# Patient Record
Sex: FEMALE | Race: WHITE | Employment: FULL TIME | URBAN - METROPOLITAN AREA
[De-identification: names, ages, dates, MRNs, and addresses within clinical notes are randomized per-mention and may not be internally consistent; named-entity substitution may affect disease eponyms.]

---

## 2022-09-19 VITALS — HEIGHT: 67 IN

## 2022-09-19 PROBLEM — E66.9 OBESITY: Status: ACTIVE | Noted: 2022-09-19

## 2022-09-22 ENCOUNTER — OFFICE VISIT (OUTPATIENT)
Dept: OBGYN CLINIC | Age: 37
End: 2022-09-22
Payer: COMMERCIAL

## 2022-09-22 VITALS
HEIGHT: 67 IN | DIASTOLIC BLOOD PRESSURE: 64 MMHG | BODY MASS INDEX: 23.6 KG/M2 | SYSTOLIC BLOOD PRESSURE: 112 MMHG | WEIGHT: 150.38 LBS

## 2022-09-22 DIAGNOSIS — Z12.4 SCREENING FOR MALIGNANT NEOPLASM OF CERVIX: Primary | ICD-10-CM

## 2022-09-22 DIAGNOSIS — Z01.419 ROUTINE GYNECOLOGICAL EXAMINATION: ICD-10-CM

## 2022-09-22 PROCEDURE — 58301 REMOVE INTRAUTERINE DEVICE: CPT | Performed by: OBSTETRICS & GYNECOLOGY

## 2022-09-22 PROCEDURE — 99385 PREV VISIT NEW AGE 18-39: CPT | Performed by: OBSTETRICS & GYNECOLOGY

## 2022-09-22 RX ORDER — LORATADINE 10 MG/1
10 TABLET ORAL
COMMUNITY

## 2022-09-22 NOTE — PROGRESS NOTES
Anna Negrete is a , 44 y.o. female   Patient's last menstrual period was 2022 (exact date). She presents for her annual    She is having no significant problems. Menstrual status:  Cycles are minimal to none with hormone containing IUS. Flow: light. She does not have dysmenorrhea. Medical conditions:  Since her last annual GYN exam about one year ago, she has not the following changes in her health history: none. Mammogram History:    JENAE Results (most recent):  No results found for this or any previous visit. DEXA Results (most recent):  No results found for this or any previous visit. Past Medical History:   Diagnosis Date    History of abnormal cervical Pap smear     History of chlamydia infection 2014    History of depression     Obesity 2022    Smoker      Past Surgical History:   Procedure Laterality Date    HX GYN      colposcopy    HX WISDOM TEETH EXTRACTION         Prior to Admission medications    Medication Sig Start Date End Date Taking? Authorizing Provider   loratadine (Claritin) 10 mg tablet Take 10 mg by mouth. Yes Provider, Historical       No Known Allergies       Tobacco History:  reports that she has been smoking cigarettes. She has been smoking an average of .5 packs per day. She has never used smokeless tobacco.  Alcohol use:  reports current alcohol use. Drug use:  reports that she does not currently use drugs. Family Medical/Cancer History:   Family History   Problem Relation Age of Onset    Hypertension Mother     Heart Disease Mother     High Cholesterol Mother     Anxiety Mother     GERD Mother     Thyroid Disease Mother     Kidney Disease Sister         stones    Hearing Impairment Sister     Diabetes Brother     Asthma Brother     Hearing Impairment Maternal Aunt           Review of Systems   Constitutional:  Negative for chills, fever, malaise/fatigue and weight loss.    HENT:  Negative for congestion, ear pain, sinus pain and tinnitus. Eyes:  Negative for blurred vision and double vision. Respiratory:  Negative for cough, shortness of breath and wheezing. Cardiovascular:  Negative for chest pain and palpitations. Gastrointestinal:  Negative for abdominal pain, blood in stool, constipation, diarrhea, heartburn, nausea and vomiting. Genitourinary:  Negative for dysuria, flank pain, frequency, hematuria and urgency. Musculoskeletal:  Negative for joint pain and myalgias. Skin:  Negative for itching and rash. Neurological:  Negative for dizziness, weakness and headaches. Psychiatric/Behavioral:  Negative for depression, memory loss and suicidal ideas. The patient is not nervous/anxious and does not have insomnia. Physical Exam  Constitutional:       Appearance: Normal appearance. HENT:      Head: Normocephalic and atraumatic. Cardiovascular:      Rate and Rhythm: Normal rate. Heart sounds: Normal heart sounds. Pulmonary:      Effort: Pulmonary effort is normal.      Breath sounds: Normal breath sounds. Chest:   Breasts:     Right: Normal.      Left: Normal.   Abdominal:      General: Abdomen is flat. Palpations: Abdomen is soft. Genitourinary:     General: Normal vulva. Vagina: Normal.      Cervix: Normal.      Uterus: Normal.       Adnexa: Right adnexa normal and left adnexa normal.      Rectum: Normal.      Comments: PAP Obtained    After verbal consent- (IUD placed in 2016, pt desires removal) strings grasped with ring forceps and removed without issues, pt shown IUD, noted to be intact    Neurological:      Mental Status: She is alert. Psychiatric:         Mood and Affect: Mood normal.         Behavior: Behavior normal.         Thought Content:  Thought content normal.        Visit Vitals  /64 (BP 1 Location: Left upper arm, BP Patient Position: Sitting, BP Cuff Size: Small adult)   Ht 5' 6.8\" (1.697 m)   Wt 150 lb 6 oz (68.2 kg)   LMP 09/22/2022 (Exact Date)   BMI 23.69 kg/m²         Assessment: Diagnoses and all orders for this visit:    1. Screening for malignant neoplasm of cervix  -     PAP IG, RFX APTIMA HPV ASCUS ([id number])    2.  Routine gynecological examination  -     PAP IG, RFX APTIMA HPV ASCUS ([id number])      Plan: Questions addressed  Counseled re: diet, exercise, healthy lifestyle  Return for Annual  Rec annual mammogram @ 39      Follow-up and Dispositions    Return for 1 yr annual.

## 2022-09-22 NOTE — PROGRESS NOTES
Chief Complaint   Patient presents with    New Patient     Annual exam     Visit Vitals  /64 (BP 1 Location: Left upper arm, BP Patient Position: Sitting, BP Cuff Size: Small adult)   Ht 5' 6.8\" (1.697 m)   Wt 150 lb 6 oz (68.2 kg)   LMP 09/22/2022 (Exact Date)   BMI 23.69 kg/m²

## 2022-09-26 LAB
CYTOLOGIST CVX/VAG CYTO: ABNORMAL
CYTOLOGY CVX/VAG DOC CYTO: ABNORMAL
CYTOLOGY CVX/VAG DOC THIN PREP: ABNORMAL
DX ICD CODE: ABNORMAL
DX ICD CODE: ABNORMAL
LABCORP, 190119: ABNORMAL
Lab: ABNORMAL
OTHER STN SPEC: ABNORMAL
PATHOLOGIST CVX/VAG CYTO: ABNORMAL
STAT OF ADQ CVX/VAG CYTO-IMP: ABNORMAL

## 2022-09-28 NOTE — PROGRESS NOTES
Pap: IRMAIL  [facility]  Rec: [facility]  DWP  Will have staff call to get her in for an appt.

## 2022-09-30 PROBLEM — R87.613 HGSIL ON CYTOLOGIC SMEAR OF CERVIX: Status: ACTIVE | Noted: 2022-09-20

## 2022-10-07 ENCOUNTER — OFFICE VISIT (OUTPATIENT)
Dept: OBGYN CLINIC | Age: 37
End: 2022-10-07
Payer: COMMERCIAL

## 2022-10-07 VITALS — BODY MASS INDEX: 23.6 KG/M2 | WEIGHT: 150.38 LBS | HEIGHT: 67 IN

## 2022-10-07 DIAGNOSIS — R87.613 HGSIL (HIGH GRADE SQUAMOUS INTRAEPITHELIAL LESION) ON PAP SMEAR OF CERVIX: Primary | ICD-10-CM

## 2022-10-07 PROCEDURE — 57454 BX/CURETT OF CERVIX W/SCOPE: CPT | Performed by: OBSTETRICS & GYNECOLOGY

## 2022-10-07 NOTE — PROGRESS NOTES
Procedure Note: Colposcopy     Penelope is a ,  44 y.o. female WHITE/NON- Patient's last menstrual period was 2022 (exact date). She presents for colposcopy for evaluation of a cervical abnormality with a pap smear abnormality consisting of HSIL. After being presented with the risks, benefits and alternatives has she signed a consent for the procedure. She states that she understands the need for the procedure and has no further questions. She was informed that she may experience discomfort. Procedure:  She was positioned in the dorsal lithotomy position and a speculum was inserted into the vagina. Dilute acetic acid was applied to the cervix. The colposcope was used to visualize the cervix. Procedure: The transformation zone was completely visualized. Findings: This colposcopy was satisfactory. The procedure was notable for white epithelium on the cervix. Biopsies were taken from the cervix at the 10,2 and 5 o'clock areas. Monsels was applied to the cervix. Endocervical currettage: An endocervical curettage was performed. Post Procedure Status: The patient tolerated the procedure well with minimal discomfort. She was observed for 10 minutes and released in good condition. Diagnoses and all orders for this visit:    1.  HGSIL (high grade squamous intraepithelial lesion) on Pap smear of cervix  -     SURGICAL PATHOLOGY  -     SURGICAL PATHOLOGY        Plan: Questions addressed  Counseled re: diet, exercise, healthy lifestyle  Return for Annual

## 2022-10-07 NOTE — PROGRESS NOTES
Chief Complaint   Patient presents with    Colposcopy     9-22-22 pap=HGSIL     Visit Vitals  Ht 5' 6.8\" (1.697 m)   Wt 150 lb 6 oz (68.2 kg)   LMP 09/22/2022 (Exact Date)   BMI 23.69 kg/m²

## 2022-10-11 LAB
CPT DISCLAIMER: ABNORMAL
DIAGNOSIS SYNOPSIS:: ABNORMAL
DX ICD CODE: ABNORMAL
DX ICD CODE: ABNORMAL
PATH REPORT.FINAL DX SPEC: ABNORMAL
PATH REPORT.GROSS SPEC: ABNORMAL
PATH REPORT.SITE OF ORIGIN SPEC: ABNORMAL
PATHOLOGIST NAME: ABNORMAL
PAYMENT PROCEDURE: ABNORMAL

## 2022-10-12 NOTE — PROGRESS NOTES
Colpo PATH: HGSIL on ectocx and ECC  Options DWP--LEEP or LAVH  Questions addressed  Pt to decide and call back

## 2022-10-25 ENCOUNTER — OFFICE VISIT (OUTPATIENT)
Dept: OBGYN CLINIC | Age: 37
End: 2022-10-25
Payer: COMMERCIAL

## 2022-10-25 VITALS
WEIGHT: 149.7 LBS | RESPIRATION RATE: 16 BRPM | BODY MASS INDEX: 23.49 KG/M2 | HEIGHT: 67 IN | SYSTOLIC BLOOD PRESSURE: 136 MMHG | DIASTOLIC BLOOD PRESSURE: 77 MMHG | OXYGEN SATURATION: 98 % | HEART RATE: 103 BPM

## 2022-10-25 DIAGNOSIS — D06.9 HIGH GRADE SQUAMOUS INTRAEPITHELIAL LESION (HGSIL), GRADE 3 CIN, ON BIOPSY OF CERVIX: Primary | ICD-10-CM

## 2022-10-25 PROCEDURE — 99213 OFFICE O/P EST LOW 20 MIN: CPT | Performed by: OBSTETRICS & GYNECOLOGY

## 2022-10-25 NOTE — PROGRESS NOTES
Anna Negrete is a , 44 y.o. female   Patient's last menstrual period was 10/18/2022. She presents for her problem    She is having no significant problems. HGSIL on bxs- 20 yr hx of abnl paps      Menstrual status:  Cycles are usually regular with a 26-32 day interval with 3-7 day duration. Flow: moderate. She does not have dysmenorrhea. Medical conditions:  Since her last annual GYN exam about one year ago, she has not the following changes in her health history: none. Mammogram History:    JENAE Results (most recent):  No results found for this or any previous visit. DEXA Results (most recent):  No results found for this or any previous visit. Past Medical History:   Diagnosis Date    HGSIL on cytologic smear of cervix 2022    History of abnormal cervical Pap smear     History of chlamydia infection 2014    History of depression     Obesity 2022    Smoker      Past Surgical History:   Procedure Laterality Date    COLPOSCOPY  10/07/2022    HX GYN      colposcopy    HX WISDOM TEETH EXTRACTION         Prior to Admission medications    Medication Sig Start Date End Date Taking? Authorizing Provider   loratadine (Claritin) 10 mg tablet Take 10 mg by mouth. Provider, Historical       No Known Allergies       Tobacco History:  reports that she has been smoking cigarettes. She has been smoking an average of .5 packs per day. She has never used smokeless tobacco.  Alcohol use:  reports current alcohol use. Drug use:  reports that she does not currently use drugs.     Family Medical/Cancer History:   Family History   Problem Relation Age of Onset    Hypertension Mother     Heart Disease Mother     High Cholesterol Mother     Anxiety Mother     GERD Mother     Thyroid Disease Mother     Kidney Disease Sister         stones    Hearing Impairment Sister     Diabetes Brother     Asthma Brother     Hearing Impairment Maternal Aunt           Review of Systems Constitutional:  Negative for chills, fever, malaise/fatigue and weight loss. HENT:  Negative for congestion, ear pain, sinus pain and tinnitus. Eyes:  Negative for blurred vision and double vision. Respiratory:  Negative for cough, shortness of breath and wheezing. Cardiovascular:  Negative for chest pain and palpitations. Gastrointestinal:  Negative for abdominal pain, blood in stool, constipation, diarrhea, heartburn, nausea and vomiting. Genitourinary:  Negative for dysuria, flank pain, frequency, hematuria and urgency. Musculoskeletal:  Negative for joint pain and myalgias. Skin:  Negative for itching and rash. Neurological:  Negative for dizziness, weakness and headaches. Psychiatric/Behavioral:  Negative for depression, memory loss and suicidal ideas. The patient is not nervous/anxious and does not have insomnia. Physical Exam  Constitutional:       Appearance: Normal appearance. HENT:      Head: Normocephalic and atraumatic. Abdominal:      General: Abdomen is flat. Palpations: Abdomen is soft. Neurological:      Mental Status: She is alert. Psychiatric:         Mood and Affect: Mood normal.         Behavior: Behavior normal.         Thought Content: Thought content normal.        Visit Vitals  /77 (BP 1 Location: Left upper arm, BP Patient Position: Sitting, BP Cuff Size: Adult)   Pulse (!) 103   Resp 16   Ht 5' 6.8\" (1.697 m)   Wt 149 lb 11.2 oz (67.9 kg)   LMP 10/18/2022   SpO2 98%   BMI 23.59 kg/m²         Assessment: Diagnoses and all orders for this visit:    1.  High grade squamous intraepithelial lesion (HGSIL), grade 3 DANIELLE, on biopsy of cervix    DWP and her - Dx and options- LEEP vs. LAVH  Risks benefits and alternatives DWP   They will discuss and call back with desired option    Plan: Questions addressed  Counseled re: diet, exercise, healthy lifestyle  Return for Annual  Rec annual mammogram

## 2022-10-25 NOTE — PROGRESS NOTES
1. Have you been to the ER, urgent care clinic since your last visit? Hospitalized since your last visit? No    2. Have you seen or consulted any other health care providers outside of the [address] [place] [address] since your last visit? Include any pap smears or colon screening.  No    Visit Vitals  /77 (BP 1 Location: Left upper arm, BP Patient Position: Sitting, BP Cuff Size: Adult)   Pulse (!) 103   Resp 16   Ht 5' 6.8\" (1.697 m)   Wt 149 lb 11.2 oz (67.9 kg)   LMP 10/18/2022   SpO2 98%   BMI 23.59 kg/m²       Chief Complaint   Patient presents with    Follow-up     Discuss treatment options

## 2022-12-13 ENCOUNTER — TELEPHONE (OUTPATIENT)
Dept: OBGYN CLINIC | Age: 37
End: 2022-12-13

## 2022-12-13 NOTE — TELEPHONE ENCOUNTER
Patient called and stated Dr. Bertram Jack told her to call and let him know when she is ready to do surgery. I don't have any consents or a surgery request for patient. Please advise.

## 2022-12-29 ENCOUNTER — TELEPHONE (OUTPATIENT)
Dept: OBGYN CLINIC | Age: 37
End: 2022-12-29

## 2022-12-29 NOTE — TELEPHONE ENCOUNTER
Patient called and left a vm about scheduling a preop appt w/Dr. Izaguirre---called and spw the patient scheduled her for 01/25/23 at 9:45AM.

## 2023-01-25 ENCOUNTER — OFFICE VISIT (OUTPATIENT)
Dept: OBGYN CLINIC | Age: 38
End: 2023-01-25
Payer: COMMERCIAL

## 2023-01-25 VITALS
DIASTOLIC BLOOD PRESSURE: 70 MMHG | HEIGHT: 67 IN | BODY MASS INDEX: 24.09 KG/M2 | WEIGHT: 153.5 LBS | SYSTOLIC BLOOD PRESSURE: 120 MMHG

## 2023-01-25 DIAGNOSIS — Z87.410 PERSONAL HISTORY OF CERVICAL DYSPLASIA: ICD-10-CM

## 2023-01-25 DIAGNOSIS — R87.613 HGSIL ON CYTOLOGIC SMEAR OF CERVIX: Primary | ICD-10-CM

## 2023-01-25 PROCEDURE — 99213 OFFICE O/P EST LOW 20 MIN: CPT | Performed by: OBSTETRICS & GYNECOLOGY

## 2023-01-25 NOTE — PROGRESS NOTES
April Walden Burkitt is a , 44 y.o. female   Patient's last menstrual period was 2023 (exact date). She presents for her problem    She is having  HGSIL on pap- Colpo: ECC and Ectocx bx : DANIELLE II .      Menstrual status:  Cycles are often irregular with no apparent pattern. Flow: moderate. She does not have dysmenorrhea. Medical conditions:  Since her last annual GYN exam about one year ago, she has not the following changes in her health history: none. Mammogram History:    JENAE Results (most recent):  No results found for this or any previous visit. DEXA Results (most recent):  No results found for this or any previous visit. Past Medical History:   Diagnosis Date    HGSIL on cytologic smear of cervix 2022    History of abnormal cervical Pap smear     History of chlamydia infection 2014    History of depression     Obesity 2022    Smoker      Past Surgical History:   Procedure Laterality Date    COLPOSCOPY  10/07/2022    HX GYN      colposcopy    HX WISDOM TEETH EXTRACTION         Prior to Admission medications    Medication Sig Start Date End Date Taking? Authorizing Provider   loratadine (CLARITIN) 10 mg tablet Take 10 mg by mouth. Yes Provider, Historical       No Known Allergies       Tobacco History:  reports that she has been smoking cigarettes. She has been smoking an average of .5 packs per day. She has never used smokeless tobacco.  Alcohol use:  reports current alcohol use. Drug use:  reports that she does not currently use drugs.     Family Medical/Cancer History:   Family History   Problem Relation Age of Onset    Hypertension Mother     Heart Disease Mother     High Cholesterol Mother     Anxiety Mother     GERD Mother     Thyroid Disease Mother     Kidney Disease Sister         stones    Hearing Impairment Sister     Diabetes Brother     Asthma Brother     Hearing Impairment Maternal Aunt           Review of Systems   Constitutional:  Negative for chills, fever, malaise/fatigue and weight loss. HENT:  Negative for congestion, ear pain, sinus pain and tinnitus. Eyes:  Negative for blurred vision and double vision. Respiratory:  Negative for cough, shortness of breath and wheezing. Cardiovascular:  Negative for chest pain and palpitations. Gastrointestinal:  Negative for abdominal pain, blood in stool, constipation, diarrhea, heartburn, nausea and vomiting. Genitourinary:  Negative for dysuria, flank pain, frequency, hematuria and urgency. Musculoskeletal:  Negative for joint pain and myalgias. Skin:  Negative for itching and rash. Neurological:  Negative for dizziness, weakness and headaches. Psychiatric/Behavioral:  Negative for depression, memory loss and suicidal ideas. The patient is not nervous/anxious and does not have insomnia. Physical Exam  Constitutional:       Appearance: Normal appearance. HENT:      Head: Normocephalic and atraumatic. Cardiovascular:      Rate and Rhythm: Normal rate. Heart sounds: Normal heart sounds. Pulmonary:      Effort: Pulmonary effort is normal.      Breath sounds: Normal breath sounds. Abdominal:      General: Abdomen is flat. Palpations: Abdomen is soft. Neurological:      Mental Status: She is alert. Psychiatric:         Mood and Affect: Mood normal.         Behavior: Behavior normal.         Thought Content: Thought content normal.        Visit Vitals  /70 (BP 1 Location: Left upper arm, BP Patient Position: Sitting, BP Cuff Size: Small adult)   Ht 5' 6.8\" (1.697 m)   Wt 153 lb 8 oz (69.6 kg)   LMP 01/05/2023 (Exact Date)   BMI 24.19 kg/m²         Assessment: Diagnoses and all orders for this visit:    1. HGSIL on cytologic smear of cervix    2.  Personal history of cervical dysplasia    Options DWP- given ECC involvement, proceed with [facility]  Risks benefits and alternatives DWP    Plan: Questions addressed  Counseled re: diet, exercise, healthy lifestyle

## 2023-01-25 NOTE — PROGRESS NOTES
Chief Complaint   Patient presents with    Pre-op Exam     Visit Vitals  /70 (BP 1 Location: Left upper arm, BP Patient Position: Sitting, BP Cuff Size: Small adult)   Ht 5' 6.8\" (1.697 m)   Wt 153 lb 8 oz (69.6 kg)   LMP 01/05/2023 (Exact Date)   BMI 24.19 kg/m²

## 2023-02-09 ENCOUNTER — HOSPITAL ENCOUNTER (OUTPATIENT)
Dept: PREADMISSION TESTING | Age: 38
End: 2023-02-09
Payer: COMMERCIAL

## 2023-02-09 VITALS
HEIGHT: 67 IN | OXYGEN SATURATION: 100 % | WEIGHT: 153.4 LBS | SYSTOLIC BLOOD PRESSURE: 136 MMHG | TEMPERATURE: 98.1 F | DIASTOLIC BLOOD PRESSURE: 88 MMHG | HEART RATE: 89 BPM | RESPIRATION RATE: 18 BRPM | BODY MASS INDEX: 24.08 KG/M2

## 2023-02-09 LAB
ABO + RH BLD: NORMAL
ALBUMIN SERPL-MCNC: 3.9 G/DL (ref 3.5–5)
ALBUMIN/GLOB SERPL: 1.3 (ref 1.1–2.2)
ALP SERPL-CCNC: 47 U/L (ref 45–117)
ALT SERPL-CCNC: 29 U/L (ref 12–78)
ANION GAP SERPL CALC-SCNC: 3 MMOL/L (ref 5–15)
AST SERPL W P-5'-P-CCNC: 20 U/L (ref 15–37)
BILIRUB SERPL-MCNC: 0.6 MG/DL (ref 0.2–1)
BLOOD GROUP ANTIBODIES SERPL: NEGATIVE
BUN SERPL-MCNC: 13 MG/DL (ref 6–20)
BUN/CREAT SERPL: 23 (ref 12–20)
CA-I BLD-MCNC: 9.2 MG/DL (ref 8.5–10.1)
CHLORIDE SERPL-SCNC: 108 MMOL/L (ref 97–108)
CO2 SERPL-SCNC: 27 MMOL/L (ref 21–32)
CREAT SERPL-MCNC: 0.57 MG/DL (ref 0.55–1.02)
ERYTHROCYTE [DISTWIDTH] IN BLOOD BY AUTOMATED COUNT: 13.8 % (ref 11.5–14.5)
EST. AVERAGE GLUCOSE BLD GHB EST-MCNC: 91 MG/DL
GLOBULIN SER CALC-MCNC: 3 G/DL (ref 2–4)
GLUCOSE SERPL-MCNC: 106 MG/DL (ref 65–100)
HBA1C MFR BLD: 4.8 % (ref 4–5.6)
HCT VFR BLD AUTO: 42.2 % (ref 35–47)
HGB BLD-MCNC: 14.1 G/DL (ref 11.5–16)
MCH RBC QN AUTO: 31.2 PG (ref 26–34)
MCHC RBC AUTO-ENTMCNC: 33.4 G/DL (ref 30–36.5)
MCV RBC AUTO: 93.4 FL (ref 80–99)
NRBC # BLD: 0 K/UL (ref 0–0.01)
NRBC BLD-RTO: 0 PER 100 WBC
PLATELET # BLD AUTO: 259 K/UL (ref 150–400)
PMV BLD AUTO: 10.2 FL (ref 8.9–12.9)
POTASSIUM SERPL-SCNC: 4.6 MMOL/L (ref 3.5–5.1)
PROT SERPL-MCNC: 6.9 G/DL (ref 6.4–8.2)
RBC # BLD AUTO: 4.52 M/UL (ref 3.8–5.2)
SODIUM SERPL-SCNC: 138 MMOL/L (ref 136–145)
SPECIMEN EXP DATE BLD: NORMAL
WBC # BLD AUTO: 7.7 K/UL (ref 3.6–11)

## 2023-02-09 PROCEDURE — 80053 COMPREHEN METABOLIC PANEL: CPT

## 2023-02-09 PROCEDURE — 83036 HEMOGLOBIN GLYCOSYLATED A1C: CPT

## 2023-02-09 PROCEDURE — 85027 COMPLETE CBC AUTOMATED: CPT

## 2023-02-09 PROCEDURE — 86900 BLOOD TYPING SEROLOGIC ABO: CPT

## 2023-02-09 PROCEDURE — 36415 COLL VENOUS BLD VENIPUNCTURE: CPT

## 2023-02-13 ENCOUNTER — ANESTHESIA (OUTPATIENT)
Dept: SURGERY | Age: 38
End: 2023-02-13
Payer: COMMERCIAL

## 2023-02-13 ENCOUNTER — ANESTHESIA EVENT (OUTPATIENT)
Dept: SURGERY | Age: 38
End: 2023-02-13
Payer: COMMERCIAL

## 2023-02-13 ENCOUNTER — HOSPITAL ENCOUNTER (OUTPATIENT)
Age: 38
Discharge: HOME | End: 2023-02-14
Attending: OBSTETRICS & GYNECOLOGY | Admitting: OBSTETRICS & GYNECOLOGY
Payer: COMMERCIAL

## 2023-02-13 DIAGNOSIS — G89.18 POST-OPERATIVE PAIN: Primary | ICD-10-CM

## 2023-02-13 PROBLEM — R87.613 HGSIL (HIGH GRADE SQUAMOUS INTRAEPITHELIAL LESION) ON PAP SMEAR OF CERVIX: Status: ACTIVE | Noted: 2023-02-13

## 2023-02-13 LAB
HCG UR QL: NEGATIVE
HGB BLD-MCNC: 11.8 G/DL (ref 11.5–16)

## 2023-02-13 PROCEDURE — 76060000035 HC ANESTHESIA 2 TO 2.5 HR: Performed by: OBSTETRICS & GYNECOLOGY

## 2023-02-13 PROCEDURE — 77030013079 HC BLNKT BAIR HGGR 3M -A: Performed by: ANESTHESIOLOGY

## 2023-02-13 PROCEDURE — C1765 ADHESION BARRIER: HCPCS | Performed by: OBSTETRICS & GYNECOLOGY

## 2023-02-13 PROCEDURE — 74011000250 HC RX REV CODE- 250: Performed by: NURSE ANESTHETIST, CERTIFIED REGISTERED

## 2023-02-13 PROCEDURE — 77030031139 HC SUT VCRL2 J&J -A: Performed by: OBSTETRICS & GYNECOLOGY

## 2023-02-13 PROCEDURE — 74011250636 HC RX REV CODE- 250/636: Performed by: OBSTETRICS & GYNECOLOGY

## 2023-02-13 PROCEDURE — 77030012799 HC TRCR GELPRT BLN AMR -B: Performed by: OBSTETRICS & GYNECOLOGY

## 2023-02-13 PROCEDURE — 65410000002 HC RM PRIVATE OB

## 2023-02-13 PROCEDURE — 77030010507 HC ADH SKN DERMBND J&J -B: Performed by: OBSTETRICS & GYNECOLOGY

## 2023-02-13 PROCEDURE — 77030008684 HC TU ET CUF COVD -B: Performed by: ANESTHESIOLOGY

## 2023-02-13 PROCEDURE — 77030002933 HC SUT MCRYL J&J -A: Performed by: OBSTETRICS & GYNECOLOGY

## 2023-02-13 PROCEDURE — 74011250637 HC RX REV CODE- 250/637: Performed by: OBSTETRICS & GYNECOLOGY

## 2023-02-13 PROCEDURE — 36415 COLL VENOUS BLD VENIPUNCTURE: CPT

## 2023-02-13 PROCEDURE — 2709999900 HC NON-CHARGEABLE SUPPLY: Performed by: OBSTETRICS & GYNECOLOGY

## 2023-02-13 PROCEDURE — 76210000006 HC OR PH I REC 0.5 TO 1 HR: Performed by: OBSTETRICS & GYNECOLOGY

## 2023-02-13 PROCEDURE — 77030026438 HC STYL ET INTUB CARD -A: Performed by: ANESTHESIOLOGY

## 2023-02-13 PROCEDURE — 81025 URINE PREGNANCY TEST: CPT

## 2023-02-13 PROCEDURE — 74011250636 HC RX REV CODE- 250/636: Performed by: NURSE ANESTHETIST, CERTIFIED REGISTERED

## 2023-02-13 PROCEDURE — 85018 HEMOGLOBIN: CPT

## 2023-02-13 PROCEDURE — 74011000250 HC RX REV CODE- 250: Performed by: OBSTETRICS & GYNECOLOGY

## 2023-02-13 PROCEDURE — 77030019908 HC STETH ESOPH SIMS -A: Performed by: ANESTHESIOLOGY

## 2023-02-13 PROCEDURE — 88307 TISSUE EXAM BY PATHOLOGIST: CPT

## 2023-02-13 PROCEDURE — 77030016151 HC PROTCTR LNS DFOG COVD -B: Performed by: OBSTETRICS & GYNECOLOGY

## 2023-02-13 PROCEDURE — 77030008606 HC TRCR ENDOSC KII AMR -B: Performed by: OBSTETRICS & GYNECOLOGY

## 2023-02-13 PROCEDURE — 76010000131 HC OR TIME 2 TO 2.5 HR: Performed by: OBSTETRICS & GYNECOLOGY

## 2023-02-13 RX ORDER — DEXMEDETOMIDINE HYDROCHLORIDE 100 UG/ML
INJECTION, SOLUTION INTRAVENOUS AS NEEDED
Status: DISCONTINUED | OUTPATIENT
Start: 2023-02-13 | End: 2023-02-13 | Stop reason: HOSPADM

## 2023-02-13 RX ORDER — BUPIVACAINE HYDROCHLORIDE 2.5 MG/ML
INJECTION, SOLUTION EPIDURAL; INFILTRATION; INTRACAUDAL AS NEEDED
Status: DISCONTINUED | OUTPATIENT
Start: 2023-02-13 | End: 2023-02-13 | Stop reason: HOSPADM

## 2023-02-13 RX ORDER — DIPHENHYDRAMINE HYDROCHLORIDE 50 MG/ML
12.5 INJECTION, SOLUTION INTRAMUSCULAR; INTRAVENOUS
Status: DISCONTINUED | OUTPATIENT
Start: 2023-02-13 | End: 2023-02-14 | Stop reason: HOSPADM

## 2023-02-13 RX ORDER — SODIUM CHLORIDE 0.9 % (FLUSH) 0.9 %
5-40 SYRINGE (ML) INJECTION EVERY 8 HOURS
Status: DISCONTINUED | OUTPATIENT
Start: 2023-02-13 | End: 2023-02-14 | Stop reason: HOSPADM

## 2023-02-13 RX ORDER — KETOROLAC TROMETHAMINE 30 MG/ML
15 INJECTION, SOLUTION INTRAMUSCULAR; INTRAVENOUS
Status: DISCONTINUED | OUTPATIENT
Start: 2023-02-13 | End: 2023-02-14 | Stop reason: HOSPADM

## 2023-02-13 RX ORDER — ONDANSETRON 2 MG/ML
INJECTION INTRAMUSCULAR; INTRAVENOUS AS NEEDED
Status: DISCONTINUED | OUTPATIENT
Start: 2023-02-13 | End: 2023-02-13 | Stop reason: HOSPADM

## 2023-02-13 RX ORDER — HYDROMORPHONE HYDROCHLORIDE 1 MG/ML
0.5 INJECTION, SOLUTION INTRAMUSCULAR; INTRAVENOUS; SUBCUTANEOUS
Status: DISCONTINUED | OUTPATIENT
Start: 2023-02-13 | End: 2023-02-13 | Stop reason: HOSPADM

## 2023-02-13 RX ORDER — PROPOFOL 10 MG/ML
INJECTION, EMULSION INTRAVENOUS AS NEEDED
Status: DISCONTINUED | OUTPATIENT
Start: 2023-02-13 | End: 2023-02-13 | Stop reason: HOSPADM

## 2023-02-13 RX ORDER — SODIUM CHLORIDE 0.9 % (FLUSH) 0.9 %
5-40 SYRINGE (ML) INJECTION AS NEEDED
Status: DISCONTINUED | OUTPATIENT
Start: 2023-02-13 | End: 2023-02-13 | Stop reason: HOSPADM

## 2023-02-13 RX ORDER — SODIUM CHLORIDE 0.9 % (FLUSH) 0.9 %
5-40 SYRINGE (ML) INJECTION EVERY 8 HOURS
Status: DISCONTINUED | OUTPATIENT
Start: 2023-02-13 | End: 2023-02-13 | Stop reason: HOSPADM

## 2023-02-13 RX ORDER — ONDANSETRON 2 MG/ML
4 INJECTION INTRAMUSCULAR; INTRAVENOUS AS NEEDED
Status: DISCONTINUED | OUTPATIENT
Start: 2023-02-13 | End: 2023-02-13 | Stop reason: HOSPADM

## 2023-02-13 RX ORDER — LIDOCAINE HYDROCHLORIDE 20 MG/ML
INJECTION, SOLUTION EPIDURAL; INFILTRATION; INTRACAUDAL; PERINEURAL AS NEEDED
Status: DISCONTINUED | OUTPATIENT
Start: 2023-02-13 | End: 2023-02-13 | Stop reason: HOSPADM

## 2023-02-13 RX ORDER — GLYCOPYRROLATE 0.2 MG/ML
INJECTION INTRAMUSCULAR; INTRAVENOUS AS NEEDED
Status: DISCONTINUED | OUTPATIENT
Start: 2023-02-13 | End: 2023-02-13 | Stop reason: HOSPADM

## 2023-02-13 RX ORDER — HYDROMORPHONE HCL IN 0.9% NACL 15 MG/30ML
PATIENT CONTROLLED ANALGESIA VIAL INTRAVENOUS
Status: DISCONTINUED | OUTPATIENT
Start: 2023-02-13 | End: 2023-02-14 | Stop reason: HOSPADM

## 2023-02-13 RX ORDER — HYDROMORPHONE HYDROCHLORIDE 1 MG/ML
INJECTION, SOLUTION INTRAMUSCULAR; INTRAVENOUS; SUBCUTANEOUS AS NEEDED
Status: DISCONTINUED | OUTPATIENT
Start: 2023-02-13 | End: 2023-02-13 | Stop reason: HOSPADM

## 2023-02-13 RX ORDER — SODIUM CHLORIDE 0.9 % (FLUSH) 0.9 %
5-40 SYRINGE (ML) INJECTION AS NEEDED
Status: DISCONTINUED | OUTPATIENT
Start: 2023-02-13 | End: 2023-02-14 | Stop reason: HOSPADM

## 2023-02-13 RX ORDER — FENTANYL CITRATE 50 UG/ML
25 INJECTION, SOLUTION INTRAMUSCULAR; INTRAVENOUS
Status: DISCONTINUED | OUTPATIENT
Start: 2023-02-13 | End: 2023-02-13 | Stop reason: HOSPADM

## 2023-02-13 RX ORDER — SODIUM CHLORIDE, SODIUM LACTATE, POTASSIUM CHLORIDE, CALCIUM CHLORIDE 600; 310; 30; 20 MG/100ML; MG/100ML; MG/100ML; MG/100ML
20 INJECTION, SOLUTION INTRAVENOUS CONTINUOUS
Status: DISCONTINUED | OUTPATIENT
Start: 2023-02-13 | End: 2023-02-14 | Stop reason: HOSPADM

## 2023-02-13 RX ORDER — HYDROMORPHONE HYDROCHLORIDE 1 MG/ML
1 INJECTION, SOLUTION INTRAMUSCULAR; INTRAVENOUS; SUBCUTANEOUS
Status: DISCONTINUED | OUTPATIENT
Start: 2023-02-13 | End: 2023-02-14 | Stop reason: HOSPADM

## 2023-02-13 RX ORDER — ONDANSETRON 2 MG/ML
4 INJECTION INTRAMUSCULAR; INTRAVENOUS
Status: DISCONTINUED | OUTPATIENT
Start: 2023-02-13 | End: 2023-02-14 | Stop reason: HOSPADM

## 2023-02-13 RX ORDER — DOCUSATE SODIUM 100 MG/1
100 CAPSULE, LIQUID FILLED ORAL 2 TIMES DAILY
Status: DISCONTINUED | OUTPATIENT
Start: 2023-02-13 | End: 2023-02-14 | Stop reason: HOSPADM

## 2023-02-13 RX ORDER — ROCURONIUM BROMIDE 10 MG/ML
INJECTION, SOLUTION INTRAVENOUS AS NEEDED
Status: DISCONTINUED | OUTPATIENT
Start: 2023-02-13 | End: 2023-02-13 | Stop reason: HOSPADM

## 2023-02-13 RX ORDER — MIDAZOLAM HYDROCHLORIDE 1 MG/ML
INJECTION, SOLUTION INTRAMUSCULAR; INTRAVENOUS AS NEEDED
Status: DISCONTINUED | OUTPATIENT
Start: 2023-02-13 | End: 2023-02-13 | Stop reason: HOSPADM

## 2023-02-13 RX ORDER — OXYCODONE HYDROCHLORIDE 5 MG/1
5 TABLET ORAL
Status: DISCONTINUED | OUTPATIENT
Start: 2023-02-13 | End: 2023-02-14 | Stop reason: HOSPADM

## 2023-02-13 RX ORDER — ENOXAPARIN SODIUM 100 MG/ML
40 INJECTION SUBCUTANEOUS EVERY 24 HOURS
Status: DISCONTINUED | OUTPATIENT
Start: 2023-02-13 | End: 2023-02-14 | Stop reason: HOSPADM

## 2023-02-13 RX ORDER — FENTANYL CITRATE 50 UG/ML
INJECTION, SOLUTION INTRAMUSCULAR; INTRAVENOUS AS NEEDED
Status: DISCONTINUED | OUTPATIENT
Start: 2023-02-13 | End: 2023-02-13 | Stop reason: HOSPADM

## 2023-02-13 RX ORDER — NEOSTIGMINE METHYLSULFATE 1 MG/ML
INJECTION, SOLUTION INTRAVENOUS AS NEEDED
Status: DISCONTINUED | OUTPATIENT
Start: 2023-02-13 | End: 2023-02-13 | Stop reason: HOSPADM

## 2023-02-13 RX ORDER — SODIUM CHLORIDE, SODIUM LACTATE, POTASSIUM CHLORIDE, CALCIUM CHLORIDE 600; 310; 30; 20 MG/100ML; MG/100ML; MG/100ML; MG/100ML
100 INJECTION, SOLUTION INTRAVENOUS CONTINUOUS
Status: DISCONTINUED | OUTPATIENT
Start: 2023-02-13 | End: 2023-02-14 | Stop reason: HOSPADM

## 2023-02-13 RX ORDER — DEXAMETHASONE SODIUM PHOSPHATE 4 MG/ML
INJECTION, SOLUTION INTRA-ARTICULAR; INTRALESIONAL; INTRAMUSCULAR; INTRAVENOUS; SOFT TISSUE AS NEEDED
Status: DISCONTINUED | OUTPATIENT
Start: 2023-02-13 | End: 2023-02-13 | Stop reason: HOSPADM

## 2023-02-13 RX ORDER — KETOROLAC TROMETHAMINE 30 MG/ML
INJECTION, SOLUTION INTRAMUSCULAR; INTRAVENOUS AS NEEDED
Status: DISCONTINUED | OUTPATIENT
Start: 2023-02-13 | End: 2023-02-13 | Stop reason: HOSPADM

## 2023-02-13 RX ORDER — OXYCODONE HYDROCHLORIDE 10 MG/1
10 TABLET ORAL
Status: DISCONTINUED | OUTPATIENT
Start: 2023-02-13 | End: 2023-02-14 | Stop reason: HOSPADM

## 2023-02-13 RX ORDER — SODIUM CHLORIDE, SODIUM LACTATE, POTASSIUM CHLORIDE, CALCIUM CHLORIDE 600; 310; 30; 20 MG/100ML; MG/100ML; MG/100ML; MG/100ML
INJECTION, SOLUTION INTRAVENOUS
Status: DISCONTINUED | OUTPATIENT
Start: 2023-02-13 | End: 2023-02-13 | Stop reason: HOSPADM

## 2023-02-13 RX ADMIN — FENTANYL CITRATE 25 MCG: 50 INJECTION, SOLUTION INTRAMUSCULAR; INTRAVENOUS at 08:46

## 2023-02-13 RX ADMIN — DOCUSATE SODIUM 100 MG: 100 CAPSULE, LIQUID FILLED ORAL at 21:21

## 2023-02-13 RX ADMIN — KETOROLAC TROMETHAMINE 30 MG: 30 INJECTION, SOLUTION INTRAMUSCULAR at 09:10

## 2023-02-13 RX ADMIN — LIDOCAINE HYDROCHLORIDE 100 MG: 20 INJECTION, SOLUTION EPIDURAL; INFILTRATION; INTRACAUDAL; PERINEURAL at 08:10

## 2023-02-13 RX ADMIN — DEXMEDETOMIDINE HYDROCHLORIDE 4 MCG: 100 INJECTION, SOLUTION INTRAVENOUS at 08:29

## 2023-02-13 RX ADMIN — PROPOFOL 25 MG: 10 INJECTION, EMULSION INTRAVENOUS at 09:23

## 2023-02-13 RX ADMIN — DEXMEDETOMIDINE HYDROCHLORIDE 2 MCG: 100 INJECTION, SOLUTION INTRAVENOUS at 08:46

## 2023-02-13 RX ADMIN — ONDANSETRON 4 MG: 2 INJECTION INTRAMUSCULAR; INTRAVENOUS at 08:13

## 2023-02-13 RX ADMIN — MIDAZOLAM HYDROCHLORIDE 2 MG: 2 INJECTION, SOLUTION INTRAMUSCULAR; INTRAVENOUS at 08:09

## 2023-02-13 RX ADMIN — DEXMEDETOMIDINE HYDROCHLORIDE 4 MCG: 100 INJECTION, SOLUTION INTRAVENOUS at 08:40

## 2023-02-13 RX ADMIN — FENTANYL CITRATE 25 MCG: 50 INJECTION, SOLUTION INTRAMUSCULAR; INTRAVENOUS at 08:09

## 2023-02-13 RX ADMIN — FENTANYL CITRATE 50 MCG: 50 INJECTION, SOLUTION INTRAMUSCULAR; INTRAVENOUS at 08:29

## 2023-02-13 RX ADMIN — HYDROMORPHONE HYDROCHLORIDE 0.25 MG: 1 INJECTION, SOLUTION INTRAMUSCULAR; INTRAVENOUS; SUBCUTANEOUS at 08:09

## 2023-02-13 RX ADMIN — ENOXAPARIN SODIUM 40 MG: 100 INJECTION SUBCUTANEOUS at 17:19

## 2023-02-13 RX ADMIN — SODIUM CHLORIDE, POTASSIUM CHLORIDE, SODIUM LACTATE AND CALCIUM CHLORIDE 20 ML/HR: 600; 310; 30; 20 INJECTION, SOLUTION INTRAVENOUS at 06:42

## 2023-02-13 RX ADMIN — DEXMEDETOMIDINE HYDROCHLORIDE 4 MCG: 100 INJECTION, SOLUTION INTRAVENOUS at 09:09

## 2023-02-13 RX ADMIN — SODIUM CHLORIDE, POTASSIUM CHLORIDE, SODIUM LACTATE AND CALCIUM CHLORIDE: 600; 310; 30; 20 INJECTION, SOLUTION INTRAVENOUS at 08:00

## 2023-02-13 RX ADMIN — CEFAZOLIN SODIUM 2 G: 1 INJECTION, POWDER, FOR SOLUTION INTRAMUSCULAR; INTRAVENOUS at 08:11

## 2023-02-13 RX ADMIN — FENTANYL CITRATE 100 MCG: 50 INJECTION, SOLUTION INTRAMUSCULAR; INTRAVENOUS at 09:22

## 2023-02-13 RX ADMIN — GLYCOPYRROLATE 0.2 MG: 0.2 INJECTION INTRAMUSCULAR; INTRAVENOUS at 09:08

## 2023-02-13 RX ADMIN — SODIUM CHLORIDE, POTASSIUM CHLORIDE, SODIUM LACTATE AND CALCIUM CHLORIDE 100 ML/HR: 600; 310; 30; 20 INJECTION, SOLUTION INTRAVENOUS at 16:45

## 2023-02-13 RX ADMIN — HYDROMORPHONE HYDROCHLORIDE 0.25 MG: 1 INJECTION, SOLUTION INTRAMUSCULAR; INTRAVENOUS; SUBCUTANEOUS at 08:46

## 2023-02-13 RX ADMIN — PROPOFOL 150 MG: 10 INJECTION, EMULSION INTRAVENOUS at 08:10

## 2023-02-13 RX ADMIN — GLYCOPYRROLATE 0.2 MG: 0.2 INJECTION INTRAMUSCULAR; INTRAVENOUS at 09:45

## 2023-02-13 RX ADMIN — DEXAMETHASONE SODIUM PHOSPHATE 4 MG: 4 INJECTION, SOLUTION INTRA-ARTICULAR; INTRALESIONAL; INTRAMUSCULAR; INTRAVENOUS; SOFT TISSUE at 08:13

## 2023-02-13 RX ADMIN — NEOSTIGMINE METHYLSULFATE 3 MG: 1 INJECTION, SOLUTION INTRAVENOUS at 09:45

## 2023-02-13 RX ADMIN — DEXMEDETOMIDINE HYDROCHLORIDE 2 MCG: 100 INJECTION, SOLUTION INTRAVENOUS at 09:19

## 2023-02-13 RX ADMIN — DEXMEDETOMIDINE HYDROCHLORIDE 4 MCG: 100 INJECTION, SOLUTION INTRAVENOUS at 09:15

## 2023-02-13 RX ADMIN — HYDROMORPHONE HYDROCHLORIDE 0.5 MG: 1 INJECTION, SOLUTION INTRAMUSCULAR; INTRAVENOUS; SUBCUTANEOUS at 08:29

## 2023-02-13 RX ADMIN — ROCURONIUM BROMIDE 50 MG: 10 INJECTION, SOLUTION INTRAVENOUS at 08:10

## 2023-02-13 RX ADMIN — Medication: at 10:36

## 2023-02-13 NOTE — PROGRESS NOTES
TRANSFER - IN REPORT:    Verbal report received from Rima 48 on April V Junod  being received from PACU for routine post - op      Report consisted of patients Situation, Background, Assessment and   Recommendations(SBAR). Information from the following report(s) SBAR was reviewed with the receiving nurse. Opportunity for questions and clarification was provided. Assessment completed upon patients arrival to unit and care assumed. 18 Transferred from PACU. Room orientation completed. Instructed to call first time ambulating due to fall precautions. Call bell within reach.          Problem: Vaginal Hysterectomy: Day of Surgery  Goal: Activity/Safety  Outcome: Progressing Towards Goal  Goal: Diagnostic Test/Procedures  Outcome: Progressing Towards Goal  Goal: Nutrition/Diet  Outcome: Progressing Towards Goal  Goal: Discharge Planning  Outcome: Progressing Towards Goal  Goal: Medications  Outcome: Progressing Towards Goal  Goal: Respiratory  Outcome: Progressing Towards Goal  Goal: Treatments/Interventions/Procedures  Outcome: Progressing Towards Goal  Goal: Psychosocial  Outcome: Progressing Towards Goal  Goal: *Hemodynamically stable  Outcome: Progressing Towards Goal  Goal: *Optimal pain control at patient's stated goal  Outcome: Progressing Towards Goal  Goal: *Absence of infection signs and symptoms  Outcome: Progressing Towards Goal

## 2023-02-13 NOTE — ANESTHESIA PREPROCEDURE EVALUATION
Relevant Problems   RESPIRATORY SYSTEM   (+) History of chlamydia infection      ENDOCRINE   (+) Obesity       Anesthetic History   No history of anesthetic complications            Review of Systems / Medical History  Patient summary reviewed and pertinent labs reviewed    Pulmonary          Smoker         Neuro/Psych   Within defined limits           Cardiovascular  Within defined limits              Pertinent negatives: No hypertension       GI/Hepatic/Renal  Within defined limits              Endo/Other  Within defined limits           Other Findings          Past Medical History:   Diagnosis Date    HGSIL on cytologic smear of cervix 09/20/2022    History of abnormal cervical Pap smear     History of chlamydia infection 2014    History of depression     Irregular menses     Smoker        Past Surgical History:   Procedure Laterality Date    COLPOSCOPY  10/07/2022    HX GYN      colposcopy    HX OTHER SURGICAL      cysts removed from groin area approx 18 years ago    HX WISDOM TEETH EXTRACTION         Current Outpatient Medications   Medication Instructions    ascorbic acid (VITAMIN C PO) 450 mg, Oral, DAILY    loratadine (CLARITIN) 10 mg, Oral, DAILY    OTHER 1 Tablet, Oral, DAILY, Vitamin D3 50mcg-Vitamin K2 200mcg-Zinc 30mg    OTHER 20 g, Oral, DAILY, Vital Proteins Collagen Peptides- Powder mix 20g in a smoothy once daily       Current Facility-Administered Medications   Medication Dose Route Frequency    lactated Ringers infusion  20 mL/hr IntraVENous CONTINUOUS    ceFAZolin (ANCEF) 2 g in sterile water (preservative free) 20 mL IV syringe  2 g IntraVENous ONCE       Patient Vitals for the past 24 hrs:   Temp Pulse Resp BP SpO2   02/13/23 0624 36.7 °C (98 °F) 72 18 122/71 98 %       Lab Results   Component Value Date/Time    WBC 7.7 02/09/2023 11:50 AM    HGB 14.1 02/09/2023 11:50 AM    HCT 42.2 02/09/2023 11:50 AM    PLATELET 505 83/14/1233 11:50 AM    MCV 93.4 02/09/2023 11:50 AM     Lab Results   Component Value Date/Time    Sodium 138 02/09/2023 11:50 AM    Potassium 4.6 02/09/2023 11:50 AM    Chloride 108 02/09/2023 11:50 AM    CO2 27 02/09/2023 11:50 AM    Anion gap 3 (L) 02/09/2023 11:50 AM    Glucose 106 (H) 02/09/2023 11:50 AM    BUN 13 02/09/2023 11:50 AM    Creatinine 0.57 02/09/2023 11:50 AM    BUN/Creatinine ratio 23 (H) 02/09/2023 11:50 AM    Calcium 9.2 02/09/2023 11:50 AM     No results found for: APTT, PTP, INR, INREXT  Lab Results   Component Value Date/Time    Glucose 106 (H) 02/09/2023 11:50 AM         Physical Exam    Airway  Mallampati: II    Neck ROM: normal range of motion        Cardiovascular    Rhythm: regular  Rate: normal         Dental         Pulmonary  Breath sounds clear to auscultation               Abdominal         Other Findings            Anesthetic Plan    ASA: 2  Anesthesia type: general    Monitoring Plan: Continuous noninvasive hemodynamic monitoring      Induction: Intravenous  Anesthetic plan and risks discussed with: Patient

## 2023-02-13 NOTE — PERIOP NOTES
Patient alert and oriented x4, VS stable, no complaints of pain at this time.  at bedside. Bed in low position, call bell within reach.

## 2023-02-13 NOTE — ANESTHESIA POSTPROCEDURE EVALUATION
Procedure(s):  LAPAROSCOPIC ASSISTED VAGINAL HYSTERECTOMY (LAVH). general    Anesthesia Post Evaluation      Multimodal analgesia: multimodal analgesia used between 6 hours prior to anesthesia start to PACU discharge  Patient location during evaluation: PACU  Patient participation: complete - patient participated  Level of consciousness: awake  Pain score: 0  Pain management: adequate  Airway patency: patent  Anesthetic complications: no  Cardiovascular status: acceptable  Respiratory status: acceptable  Hydration status: acceptable  Post anesthesia nausea and vomiting:  controlled  Final Post Anesthesia Temperature Assessment:  Normothermia (36.0-37.5 degrees C)      INITIAL Post-op Vital signs:   Vitals Value Taken Time   /75 02/13/23 1035   Temp 36.8 °C (98.2 °F) 02/13/23 1016   Pulse 63 02/13/23 1043   Resp 13 02/13/23 1043   SpO2 96 % 02/13/23 1043   Vitals shown include unvalidated device data.

## 2023-02-13 NOTE — PROGRESS NOTES
1016: Spouse, Yovanny Bourne, updated via phone. 1058: Spouse updated with room assignment; will meet in room.

## 2023-02-13 NOTE — BRIEF OP NOTE
Brief Postoperative Note    Patient: April V Junod  YOB: 1985  MRN: [id number]    Date of Procedure: 2/13/2023     Pre-Op Diagnosis: HGSIL (high grade squamous intraepithelial lesion) on Pap smear of cervix [R87.613]  History of abnormal cervical Pap smear [Z87.42]    Post-Op Diagnosis: Same as preoperative diagnosis.       Procedure(s):  LAPAROSCOPIC ASSISTED VAGINAL HYSTERECTOMY (LAVH)    Surgeon(s):  Devin Etienne MD    Surgical Assistant: Surg Asst-1: Celso Pineda    Anesthesia: General     Estimated Blood Loss (mL): 577    Complications: None    Specimens:   ID Type Source Tests Collected by Time Destination   1 : UTERUS, CERVIX Preservative Uterus  Devin Etienne MD 2/13/2023 4592 Pathology        Implants: none    Drains: St cath- Clear pre and post operatively: 150 cc    Findings: Enlarged boggy uterus, normal adnexa bilaterally    Electronically Signed by Lamar Diaz MD on 2/13/2023 at 10:21 AM

## 2023-02-14 VITALS
SYSTOLIC BLOOD PRESSURE: 107 MMHG | RESPIRATION RATE: 16 BRPM | OXYGEN SATURATION: 99 % | TEMPERATURE: 98.4 F | WEIGHT: 153 LBS | HEART RATE: 80 BPM | DIASTOLIC BLOOD PRESSURE: 61 MMHG | BODY MASS INDEX: 24.16 KG/M2

## 2023-02-14 LAB
HCT VFR BLD AUTO: 30.3 % (ref 35–47)
HGB BLD-MCNC: 10.1 G/DL (ref 11.5–16)

## 2023-02-14 PROCEDURE — 85018 HEMOGLOBIN: CPT

## 2023-02-14 PROCEDURE — 74011250637 HC RX REV CODE- 250/637: Performed by: OBSTETRICS & GYNECOLOGY

## 2023-02-14 PROCEDURE — 36415 COLL VENOUS BLD VENIPUNCTURE: CPT

## 2023-02-14 RX ORDER — IBUPROFEN 800 MG/1
800 TABLET ORAL
Qty: 30 TABLET | Refills: 0 | Status: SHIPPED | OUTPATIENT
Start: 2023-02-14

## 2023-02-14 RX ORDER — OXYCODONE HYDROCHLORIDE 5 MG/1
5 TABLET ORAL
Qty: 20 TABLET | Refills: 0 | Status: SHIPPED | OUTPATIENT
Start: 2023-02-14 | End: 2023-02-19

## 2023-02-14 RX ADMIN — DOCUSATE SODIUM 100 MG: 100 CAPSULE, LIQUID FILLED ORAL at 08:55

## 2023-02-14 RX ADMIN — OXYCODONE HYDROCHLORIDE 5 MG: 5 TABLET ORAL at 09:29

## 2023-02-14 NOTE — PROGRESS NOTES
0930 Discharge instructions reviewed with patient. Patient aware Rx's were sent to pharmacy and aware when can take next dose. Aware of warning signs and when to notify MD.     Discharge plan of care/case management plan validated with provider discharge order. 0191 Patient discharged to main entrance via wheelchair in stable condition .     Problem: Patient Education: Go to Patient Education Activity  Goal: Patient/Family Education  Outcome: Resolved/Met     Problem: Vaginal Hysterectomy: Post-Op Day 1/Day of Discharge  Goal: Off Pathway (Use only if patient is Off Pathway)  Outcome: Resolved/Met  Goal: Activity/Safety  Outcome: Resolved/Met  Goal: Diagnostic Test/Procedures  Outcome: Resolved/Met  Goal: Nutrition/Diet  Outcome: Resolved/Met  Goal: Discharge Planning  Outcome: Resolved/Met  Goal: Medications  Outcome: Resolved/Met  Goal: Respiratory  Outcome: Resolved/Met  Goal: Treatments/Interventions/Procedures  Outcome: Resolved/Met  Goal: Psychosocial  Outcome: Resolved/Met     Problem: Vaginal Hysterectomy: Discharge Outcomes  Goal: *Demonstrates progressive activity  Outcome: Resolved/Met  Goal: *Tolerating diet  Outcome: Resolved/Met  Goal: *Hemodynamically stable  Outcome: Resolved/Met  Goal: *Describes available resources and support systems  Outcome: Resolved/Met  Goal: *Optimal pain control at patient's stated goal  Outcome: Resolved/Met  Goal: *Verbalizes understanding and describes medication purposes and frequencies  Outcome: Resolved/Met  Goal: *Lungs clear or at baseline  Outcome: Resolved/Met  Goal: *Active bowel function  Outcome: Resolved/Met  Goal: *Voiding  Outcome: Resolved/Met  Goal: *Verbalizes and demonstrates incision care  Outcome: Resolved/Met  Goal: *Expresses feelings about diagnosis and surgery  Outcome: Resolved/Met  Goal: *Received and verbalizes understanding of discharge plan and instructions  Outcome: Resolved/Met     Problem: Falls - Risk of  Goal: *Absence of Falls  Description: Document Leeanna Fierro Fall Risk and appropriate interventions in the flowsheet.   Outcome: Resolved/Met  Note: Fall Risk Interventions:            Medication Interventions: Patient to call before getting OOB, Teach patient to arise slowly

## 2023-02-14 NOTE — PROGRESS NOTES
Problem: Patient Education: Go to Patient Education Activity  Goal: Patient/Family Education  Outcome: Progressing Towards Goal     Problem: Vaginal Hysterectomy: Post-Op Day 1/Day of Discharge  Goal: Activity/Safety  Outcome: Progressing Towards Goal  Goal: Diagnostic Test/Procedures  Outcome: Progressing Towards Goal  Goal: Nutrition/Diet  Outcome: Progressing Towards Goal  Goal: Discharge Planning  Outcome: Progressing Towards Goal  Goal: Medications  Outcome: Progressing Towards Goal  Goal: Respiratory  Outcome: Progressing Towards Goal  Goal: Treatments/Interventions/Procedures  Outcome: Progressing Towards Goal  Goal: Psychosocial  Outcome: Progressing Towards Goal     Problem: Vaginal Hysterectomy: Discharge Outcomes  Goal: *Demonstrates progressive activity  Outcome: Progressing Towards Goal  Goal: *Tolerating diet  Outcome: Progressing Towards Goal  Goal: *Hemodynamically stable  Outcome: Progressing Towards Goal  Goal: *Describes available resources and support systems  Outcome: Progressing Towards Goal  Goal: *Optimal pain control at patient's stated goal  Outcome: Progressing Towards Goal  Goal: *Verbalizes understanding and describes medication purposes and frequencies  Outcome: Progressing Towards Goal  Goal: *Lungs clear or at baseline  Outcome: Progressing Towards Goal  Goal: *Active bowel function  Outcome: Progressing Towards Goal  Goal: *Voiding  Outcome: Progressing Towards Goal  Goal: *Verbalizes and demonstrates incision care  Outcome: Progressing Towards Goal  Goal: *Expresses feelings about diagnosis and surgery  Outcome: Progressing Towards Goal  Goal: *Received and verbalizes understanding of discharge plan and instructions  Outcome: Progressing Towards Goal     Problem: Falls - Risk of  Goal: *Absence of Falls  Description: Document Kishor Fall Risk and appropriate interventions in the flowsheet.   Outcome: Progressing Towards Goal  Note: Fall Risk Interventions:            Medication Interventions: Patient to call before getting OOB, Teach patient to arise slowly                   Problem: Patient Education: Go to Patient Education Activity  Goal: Patient/Family Education  Outcome: Progressing Towards Goal

## 2023-02-14 NOTE — DISCHARGE SUMMARY
[facility] Discharge Summary     Patient ID:  Anna Negrete  [id number]  44 y.o.  1985    Admit date: 2/13/2023    Discharge date and time: 2/14/23    Admitting Physician: Maria D Viera MD     Discharge Physician: Maria D Viera MD    Admission Diagnoses: HGSIL (high grade squamous intraepithelial lesion) on Pap smear of cervix [R87.613]; History of abnormal cervical Pap smear [facility] Course: Anna Negrete had unremarkeable progressive recovery. and Eating, ambulating, and voiding in a routine manner. Significant Diagnostic Studies:   Recent Results (from the past 24 hour(s))   HEMOGLOBIN    Collection Time: 02/13/23  3:45 PM   Result Value Ref Range    HGB 11.8 11.5 - 16.0 g/dL   HGB & HCT    Collection Time: 02/14/23  5:24 AM   Result Value Ref Range    HGB 10.1 (L) 11.5 - 16.0 g/dL    HCT 30.3 (L) 35.0 - 47.0 %       b  Procedures: Laparoscopic Assisted Vaginal Hysterectomy without Salpingo-Oophorectomy    Discharge Exam:  Visit Vitals  /61 (BP 1 Location: Right upper arm)   Pulse 80   Temp 98.4 °F (36.9 °C)   Resp 16   Wt 153 lb (69.4 kg)   LMP 02/08/2023 (Exact Date)   SpO2 99%   BMI 24.16 kg/m²      Patient Instructions:   Current Discharge Medication List        START taking these medications    Details   oxyCODONE IR (ROXICODONE) 5 mg immediate release tablet Take 1 Tablet by mouth every four (4) hours as needed for Pain for up to 5 days. Max Daily Amount: 30 mg.  Qty: 20 Tablet, Refills: 0    Associated Diagnoses: Post-operative pain      ibuprofen (MOTRIN) 800 mg tablet Take 1 Tablet by mouth every eight (8) hours as needed for Pain. Qty: 30 Tablet, Refills: 0           CONTINUE these medications which have NOT CHANGED    Details   ascorbic acid (VITAMIN C PO) Take 450 mg by mouth daily. !! OTHER Take 1 Tablet by mouth daily. Vitamin D3 50mcg-Vitamin K2 200mcg-Zinc 30mg      !! OTHER Take 20 g by mouth daily.  Vital Proteins Collagen Peptides- Powder mix 20g in a smoothy once daily      loratadine (CLARITIN) 10 mg tablet Take 10 mg by mouth daily. !! - Potential duplicate medications found. Please discuss with provider. Activity: physical activity is restricted per discharge instructions  Diet: resume normal diet  Wound Care: Keep wound clean and dry  Disposition: Home  Follow-up with Kandi Vaz MD in 4 weeks.     Signed:  Kandi Vaz MD  2/14/2023  9:07 AM

## 2023-02-14 NOTE — OP NOTES
[address] [place] [address]  OPERATIVE REPORT    Name:  Ne Hickman  MR#:  [id number]  :  1985  ACCOUNT #:  [de-identified]  DATE OF SERVICE:  2023    PREOPERATIVE DIAGNOSIS:  High-grade cervical dysplasia. POSTOPERATIVE DIAGNOSIS:  High-grade cervical dysplasia. PROCEDURE PERFORMED:  Laparoscopic-assisted vaginal hysterectomy. SURGEON:  Sejal Damian MD    ASSISTANT:  Tank Ferreira    ANESTHESIA:  General.    COMPLICATIONS:  None. SPECIMENS REMOVED:  Uterus and cervix. IMPLANTS:  None. ESTIMATED BLOOD LOSS:  600 mL. DRAINS:  Straight catheter. Clear urine, pre and postop. SURGICAL FINDINGS:  An enlarged boggy uterus, normal adnexa bilaterally. Bleeding is secondary to backbleeding and cuff bleeding. PROCEDURE:  The patient was taken to operating room. After an informed consent was reviewed, placed on the operating room table in the supine position and administered general anesthesia. Her legs were then placed in [address] [place] [address]. She was prepped and draped in normal sterile fashion. A time-out was performed by me. After this was done, her bladder was drained using a red rubber catheter. Clear urine was noted. Hulka tenaculum was then placed inside the uterine cavity. Attention was turned to the abdomen where an infraumbilical skin incision was made with a scalpel, taken down the underlying layer of fascia. Fascia was grasped with Kocher clamps x2, tented up and entered sharply. Peritoneum was identified, tented up and entered sharply. After direct vision into the abdominal cavity, a blunt trocar was placed under direct vision. It was secured with the balloon. The abdomen was then insufflated while monitoring pressures and flow. The patient was placed in Trendelenburg presentation. Two 5-mm trocars were placed under direct vision in the left lateral quadrant and in the right lateral quadrant. Uterus was manipulated out of the pelvis.   Above findings were noted using the Enseal device coming across the utero-ovarian pedicle, the insertion of the fallopian tube in the cornual region and across the round ligament down the lateral side to the level of the uterine vessel. There was some back bleeding from the uterus on this side. The lateral aspect of the dissection line was noted to be hemostatic. Attention was then turned to the other side where it was taken down in a similar fashion without issues. After this was done, the suction  was utilized to make sure that the bleeding was as noted above from backbleeding and not from the lateral edge of the dissection line which it was not. Any remaining blood and irrigant and the clot were removed using the suction. All instruments were then removed from the abdomen. The trocar sites were removed. The abdomen was desufflated. The infraumbilical fascia was reapproximated using 2-0 Vicryl in an interrupted fashion. The skin was closed using a 4-0 Monocryl in subcuticular fashion. Local anesthetic, 20 mL, was injected around the incision sites. Dermabond was placed. Attention was then turned down to the pelvis. The Hulka tenaculum was removed. The patient's legs were taken out of the [facility] stirrups and placed in candy cane. She was re-draped. After this was done, a small weighted speculum was placed inside the vagina and retractor anteriorly. The cervix was grasped with a single-tooth tenaculum x2 and scored from the 9 o'clock to 3 o'clock region using the scalpel. This tissue was pushed in the cephalad direction. Posterior cul-de-sac was entered sharply. Small weighted speculum was removed and a long weighted speculum placed. In an alternating fashion, bites were taken through the uterosacral ligaments, clamped, cut and suture ligated. Cardinal ligaments were then clamped, cut and suture ligated. Anterior peritoneum was pushed in the cephalad direction.   It was grasped with the pickups and entered sharply. A right-angle retractor was placed. Bites were taken in an alternating fashion to eventually where they were clamped across near the uterine vessels, and the specimen was removed. These pedicles were suture ligated using 0 Vicryl suture. After this was done, all areas were inspected. There were noted to be hemostatic. A lap was placed in the pelvis. Further inspection after this was done. The right-angle retractor was removed and placed under this. At this point, the long weighted speculum was removed. The short weighted speculum was placed and the posterior cuff was grasped with an Allis clamp. The anterior cuff and peritoneum were grasped with an Allis clamp after removing the right-angle retractor and lap. The cuff was then closed using 0 Vicryl suture in an interrupted horizontal orientation. Good hemostasis was noted. The bladder was drained and clear urine was noted. Prior to closing the cuff, a piece of Interceed was placed just above the cuff to hopefully prevent any adhesive disease in the future as well as to help with hemostasis. Sponge, lap and needle counts were correct x2. The patient tolerated the procedure without complications and was taken to the recovery room in stable condition.         MD ROSE [email or web address]  D:  02/13/2023 15:47  T:  02/13/2023 20:51  JOB #:  [id number]

## 2023-03-14 ENCOUNTER — OFFICE VISIT (OUTPATIENT)
Dept: OBGYN CLINIC | Age: 38
End: 2023-03-14
Payer: COMMERCIAL

## 2023-03-14 VITALS
SYSTOLIC BLOOD PRESSURE: 128 MMHG | DIASTOLIC BLOOD PRESSURE: 74 MMHG | BODY MASS INDEX: 24.79 KG/M2 | HEIGHT: 66 IN | WEIGHT: 154.25 LBS

## 2023-03-14 DIAGNOSIS — Z09 POSTOP CHECK: Primary | ICD-10-CM

## 2023-03-14 PROCEDURE — 99024 POSTOP FOLLOW-UP VISIT: CPT | Performed by: OBSTETRICS & GYNECOLOGY

## 2023-03-14 NOTE — PROGRESS NOTES
Chief Complaint   Patient presents with    Post OP Follow Up     2-13-23 s/p hyst     Visit Vitals  /74 (BP 1 Location: Right arm, BP Patient Position: Sitting, BP Cuff Size: Small adult)   Ht 5' 6\" (1.676 m)   Wt 154 lb 4 oz (70 kg)   LMP 02/08/2023 (Exact Date)   BMI 24.90 kg/m²

## 2023-03-14 NOTE — PROGRESS NOTES
Anna Duggan is a , 44 y.o. female   Patient's last menstrual period was 2023 (exact date). She presents for her post-op    She is having no significant problems. Menstrual status:  Cycles are hysterectomy. Flow: absent. She does not have dysmenorrhea. Medical conditions:  Since her last annual GYN exam about one year ago, she has not the following changes in her health history: none. Mammogram History:    JENAE Results (most recent):  No results found for this or any previous visit. DEXA Results (most recent):  No results found for this or any previous visit. Past Medical History:   Diagnosis Date    HGSIL on cytologic smear of cervix 2022    History of abnormal cervical Pap smear     History of chlamydia infection     History of depression     Irregular menses     Smoker      Past Surgical History:   Procedure Laterality Date    COLPOSCOPY  10/07/2022    HX GYN      colposcopy    HX HYSTERECTOMY  2023    HX OTHER SURGICAL      cysts removed from groin area approx 18 years ago    HX WISDOM TEETH EXTRACTION         Prior to Admission medications    Medication Sig Start Date End Date Taking? Authorizing Provider   ibuprofen (MOTRIN) 800 mg tablet Take 1 Tablet by mouth every eight (8) hours as needed for Pain. 23  Yes John Cates MD   ascorbic acid (VITAMIN C PO) Take 450 mg by mouth daily. Yes Provider, Historical   OTHER Take 1 Tablet by mouth daily. Vitamin D3 50mcg-Vitamin K2 200mcg-Zinc 30mg   Yes Provider, Historical   OTHER Take 20 g by mouth daily. Vital Proteins Collagen Peptides- Powder mix 20g in a smoothy once daily   Yes Provider, Historical   loratadine (CLARITIN) 10 mg tablet Take 10 mg by mouth daily. Yes Provider, Historical       No Known Allergies       Tobacco History:  reports that she has been smoking cigarettes. She has been smoking an average of 1 pack per day.  She has never used smokeless tobacco.  Alcohol use: reports current alcohol use of about 8.0 standard drinks per week. Drug use:  reports that she does not currently use drugs. Family Medical/Cancer History:   Family History   Problem Relation Age of Onset    Cancer Mother         Breast    Hypertension Mother     Heart Disease Mother     High Cholesterol Mother     Anxiety Mother     GERD Mother     Thyroid Disease Mother     Other Mother         Cardiac bypass surgery at age 45    Allergic Rhinitis Father     OSTEOARTHRITIS Sister     Hypertension Sister     Kidney Disease Sister         stones    Hearing Impairment Sister     Hydrocephalus Sister     Diabetes Brother     Asthma Brother         childhood    Hearing Impairment Maternal Aunt           Review of Systems   Constitutional:  Negative for chills, fever, malaise/fatigue and weight loss. HENT:  Negative for congestion, ear pain, sinus pain and tinnitus. Eyes:  Negative for blurred vision and double vision. Respiratory:  Negative for cough, shortness of breath and wheezing. Cardiovascular:  Negative for chest pain and palpitations. Gastrointestinal:  Negative for abdominal pain, blood in stool, constipation, diarrhea, heartburn, nausea and vomiting. Genitourinary:  Negative for dysuria, flank pain, frequency, hematuria and urgency. Musculoskeletal:  Negative for joint pain and myalgias. Skin:  Negative for itching and rash. Neurological:  Negative for dizziness, weakness and headaches. Psychiatric/Behavioral:  Negative for depression, memory loss and suicidal ideas. The patient is not nervous/anxious and does not have insomnia. Physical Exam  Constitutional:       Appearance: Normal appearance. HENT:      Head: Normocephalic and atraumatic. Abdominal:      General: Abdomen is flat. Palpations: Abdomen is soft. Comments: Incisions x 3 healed   Genitourinary:     General: Normal vulva.       Vagina: Normal.      Adnexa: Right adnexa normal and left adnexa normal. Rectum: Normal.   Neurological:      Mental Status: She is alert. Psychiatric:         Mood and Affect: Mood normal.         Behavior: Behavior normal.         Thought Content: Thought content normal.        Visit Vitals  /74 (BP 1 Location: Right arm, BP Patient Position: Sitting, BP Cuff Size: Small adult)   Ht 5' 6\" (1.676 m)   Wt 154 lb 4 oz (70 kg)   LMP 02/08/2023 (Exact Date)   BMI 24.90 kg/m²         Assessment: Diagnoses and all orders for this visit:    1.  Postop check    RTW on 3/30/23    Plan: Questions addressed  Counseled re: diet, exercise, healthy lifestyle  Return for Annual  Rec annual mammogram @ 39      Follow-up and Dispositions    Return for 1 yr annual.

## 2023-03-14 NOTE — LETTER
NOTIFICATION RETURN TO WORK / SCHOOL    3/14/2023 9:01 AM    Ms. Anna Negrete  [address] [address]      To Whom It May Concern:    Anna Colunga is currently under the care of [address]. She will return to work/school on 3/30/23. If there are questions or concerns please have the patient contact our office.         Sincerely,      Wilbur Jimenez MD

## 2023-05-21 RX ORDER — LORATADINE 10 MG/1
10 TABLET ORAL DAILY
COMMUNITY

## 2023-05-21 RX ORDER — IBUPROFEN 800 MG/1
800 TABLET ORAL EVERY 8 HOURS PRN
COMMUNITY
Start: 2023-02-14

## 2023-06-05 ENCOUNTER — PATIENT MESSAGE (OUTPATIENT)
Age: 38
End: 2023-06-05

## 2023-06-05 NOTE — TELEPHONE ENCOUNTER
From: Sagrario Tariq  To: Dr. Agnie Moon: 6/4/2023 10:59 PM EDT  Subject: Appointment Request    Appointment Request From: Sagrario Parker    With Provider: Sunshine Alexandre MD [facility]]    Preferred Date Range: 6/5/2023  6/8/2023    Preferred Times: Any Time    Reason for visit: Request an Appointment    Comments:  I have a spot on my breast Im a little concerned about

## (undated) DEVICE — BASIC SINGLE BASIN-LF: Brand: MEDLINE INDUSTRIES, INC.

## (undated) DEVICE — DRAPE,REIN 53X77,STERILE: Brand: MEDLINE

## (undated) DEVICE — SUTURE VCRL + SZ 0 L27IN ABSRB WHT CT-2 1/2 CIR TAPERPOINT VCP270H

## (undated) DEVICE — DERMABOND SKIN ADH 0.7ML --

## (undated) DEVICE — SEALER TISS L37CM SHFT DIA5MM 360DEG ROT CVD JAW TAPR TIP

## (undated) DEVICE — BARRIER TISS ADH ABSRB 3X4IN -- GYNECARE INTERCEED

## (undated) DEVICE — TROCAR: Brand: KII SHIELDED BLADED ACCESS SYSTEM

## (undated) DEVICE — GLOVE ORTHO 7 1/2   MSG9475

## (undated) DEVICE — GYN LAPAROSCOPY PROCEDURE PACK: Brand: MEDLINE INDUSTRIES, INC.

## (undated) DEVICE — VISUALIZATION SYSTEM: Brand: CLEARIFY

## (undated) DEVICE — INTENDED FOR TISSUE SEPARATION, AND OTHER PROCEDURES THAT REQUIRE A SHARP SURGICAL BLADE TO PUNCTURE OR CUT.: Brand: BARD-PARKER ® CARBON RIB-BACK BLADES

## (undated) DEVICE — TUBING INSUFFLATOR HEAT HUMIDIFIED SMK EVAC SET PNEUMOCLEAR

## (undated) DEVICE — MAJOR VAGINAL PACK: Brand: MEDLINE INDUSTRIES, INC.

## (undated) DEVICE — SUTURE VCRL SZ 2-0 L27IN ABSRB VLT L26MM UR-6 5/8 CIR J602H

## (undated) DEVICE — TROCARS: Brand: KII® BALLOON BLUNT TIP SYSTEM

## (undated) DEVICE — SOLUTION IRRIG 1000ML 0.9% SOD CHL USP POUR PLAS BTL

## (undated) DEVICE — HYPODERMIC SAFETY NEEDLE: Brand: MONOJECT

## (undated) DEVICE — SOUTHSIDE TURNOVER: Brand: MEDLINE INDUSTRIES, INC.

## (undated) DEVICE — SUTURE MCRYL + SZ 4-0 L27IN ABSRB UD L19MM PS-2 3/8 CIR MCP426H